# Patient Record
Sex: FEMALE | Race: OTHER | HISPANIC OR LATINO | Employment: FULL TIME | URBAN - NONMETROPOLITAN AREA
[De-identification: names, ages, dates, MRNs, and addresses within clinical notes are randomized per-mention and may not be internally consistent; named-entity substitution may affect disease eponyms.]

---

## 2023-09-26 ENCOUNTER — OFFICE VISIT (OUTPATIENT)
Dept: URGENT CARE | Facility: CLINIC | Age: 38
End: 2023-09-26
Payer: COMMERCIAL

## 2023-09-26 VITALS
OXYGEN SATURATION: 99 % | DIASTOLIC BLOOD PRESSURE: 60 MMHG | TEMPERATURE: 98 F | SYSTOLIC BLOOD PRESSURE: 110 MMHG | HEART RATE: 88 BPM | RESPIRATION RATE: 19 BRPM

## 2023-09-26 DIAGNOSIS — U07.1 COVID-19: ICD-10-CM

## 2023-09-26 DIAGNOSIS — R05.1 ACUTE COUGH: Primary | ICD-10-CM

## 2023-09-26 LAB
SARS-COV-2 AG UPPER RESP QL IA: POSITIVE
VALID CONTROL: ABNORMAL

## 2023-09-26 PROCEDURE — 87811 SARS-COV-2 COVID19 W/OPTIC: CPT

## 2023-09-26 PROCEDURE — 99203 OFFICE O/P NEW LOW 30 MIN: CPT

## 2023-09-26 RX ORDER — BENZONATATE 200 MG/1
200 CAPSULE ORAL 3 TIMES DAILY PRN
Qty: 20 CAPSULE | Refills: 0 | Status: SHIPPED | OUTPATIENT
Start: 2023-09-26

## 2023-09-26 RX ORDER — FLUTICASONE PROPIONATE 50 MCG
2 SPRAY, SUSPENSION (ML) NASAL DAILY
Qty: 11.1 ML | Refills: 0 | Status: SHIPPED | OUTPATIENT
Start: 2023-09-26

## 2023-09-26 NOTE — PROGRESS NOTES
[facility]        NAME: Shira Leung is a 41 y.o. female  : 1985    MRN: [id number]  DATE: 2023  TIME: 10:45 AM    Assessment and Plan   Acute cough [R05.1]  1. Acute cough  Poct Covid 19 Rapid Antigen Test    benzonatate (TESSALON) 200 MG capsule      2. COVID-19  fluticasone (FLONASE) 50 mcg/act nasal spray        Rapid COVID test was positive. Given advice for at home remedies as well as return to work note. Vital signs within normal limits. Advised follow-up family doctor if no improvement. Advised to go to the emergency room if symptoms worsen. Patient Instructions     Tylenol/ibuprofen for pain or fever. Make sure to stay well-hydrated and rest.  Water, Gatorade, Pedialyte. For sore throat-May try warm tea with honey, teaspoon of honey, throat lozenges, warm salt gargles. Continue with Mucinex. Multivitamin, zinc, vitamin D3 2000 IU daily, vitamin C 1000 mg twice daily. You tested positive for COVID today-CDC guidelines recommends 5 days of isolation followed by 5 days of masking. Your isolation would end  on . Recommended to wear a mask for 5 days following this date. If no improvement in symptoms-follow-up with your family doctor. Go to the ER if symptoms worsen. Follow up with PCP in 3-5 days. Proceed to  ER if symptoms worsen. Chief Complaint     Chief Complaint   Patient presents with   • Earache   • Sore Throat   • Cough   • Headache   • Fatigue   • sinus congestion     Started 1 day ago  OTC mucinex   Request note for work         History of Present Illness       29-year-old female presents to the clinic with 1 day of earache, sore throat, cough, headache, fatigue, sinus congestion. PT denies sick contacts. Tried over-the-counter Mucinex with minimal relief. Taking Tylenol/ibuprofen as needed. Has been drinking plenty of fluids.   Denies any fever, chest pain, shortness of breath, abdominal pain, nausea, vomiting, diarrhea. Review of Systems   Review of Systems   Constitutional: Positive for fatigue. Negative for chills and fever. HENT: Positive for congestion, postnasal drip, rhinorrhea and sore throat. Negative for ear discharge and ear pain. Eyes: Negative. Respiratory: Positive for cough. Negative for shortness of breath and wheezing. Cardiovascular: Negative. Gastrointestinal: Negative. Genitourinary: Negative. Musculoskeletal: Positive for arthralgias and myalgias. Skin: Negative. Neurological: Negative. Psychiatric/Behavioral: Negative. Current Medications       Current Outpatient Medications:   •  benzonatate (TESSALON) 200 MG capsule, Take 1 capsule (200 mg total) by mouth 3 (three) times a day as needed for cough, Disp: 20 capsule, Rfl: 0  •  fluticasone (FLONASE) 50 mcg/act nasal spray, 2 sprays into each nostril daily, Disp: 11.1 mL, Rfl: 0    Current Allergies     Allergies as of 09/26/2023   • (No Known Allergies)            The following portions of the patient's history were reviewed and updated as appropriate: allergies, current medications, past family history, past medical history, past social history, past surgical history and problem list.     History reviewed. No pertinent past medical history. Past Surgical History:   Procedure Laterality Date   • NOSE SURGERY         No family history on file. Medications have been verified. Objective   /60   Pulse 88   Temp 98 °F (36.7 °C)   Resp 19   SpO2 99%        Physical Exam     Physical Exam  Constitutional:       General: She is not in acute distress. Appearance: Normal appearance. She is not ill-appearing. HENT:      Head: Normocephalic and atraumatic. Right Ear: Tympanic membrane, ear canal and external ear normal.      Left Ear: Tympanic membrane, ear canal and external ear normal.      Nose: Congestion and rhinorrhea present.       Mouth/Throat:      Mouth: Mucous membranes are moist.      Pharynx: Oropharynx is clear. Posterior oropharyngeal erythema (mild) present. No oropharyngeal exudate. Eyes:      Extraocular Movements: Extraocular movements intact. Conjunctiva/sclera: Conjunctivae normal.      Pupils: Pupils are equal, round, and reactive to light. Cardiovascular:      Rate and Rhythm: Normal rate and regular rhythm. Pulses: Normal pulses. Heart sounds: Normal heart sounds. Pulmonary:      Effort: Pulmonary effort is normal. No respiratory distress. Breath sounds: Normal breath sounds. No stridor. No wheezing, rhonchi or rales. Chest:      Chest wall: No tenderness. Musculoskeletal:         General: Normal range of motion. Cervical back: Normal range of motion and neck supple. Lymphadenopathy:      Cervical: No cervical adenopathy. Skin:     General: Skin is warm and dry. Capillary Refill: Capillary refill takes less than 2 seconds. Findings: No rash. Neurological:      General: No focal deficit present. Mental Status: She is alert and oriented to person, place, and time. Mental status is at baseline.    Psychiatric:         Mood and Affect: Mood normal.         Behavior: Behavior normal.

## 2023-09-26 NOTE — LETTER
September 26, 2023     Patient: Armen Bol[address]   YOB: 1985   Date of Visit: 9/26/2023       To Whom it May Concern: Armen Bolivar [address] was seen in my clinic on 9/26/2023. She tested positive for COVID today on 9/26. Per CDC recommendations, 5 days of isolation would end on Sunday, October 1. She is able to return to work on this date or the following day on October 2. Recommended to wear a mask for an additional 5 days. If you have any questions or concerns, please don't hesitate to call.          Sincerely,          Fermín Barksdale PA-C        CC: No Recipients

## 2023-09-26 NOTE — PATIENT INSTRUCTIONS
Tylenol/ibuprofen for pain or fever. Make sure to stay well-hydrated and rest.  Water, Gatorade, Pedialyte. For sore throat-May try warm tea with honey, teaspoon of honey, throat lozenges, warm salt gargles. Continue with Mucinex. Multivitamin, zinc, vitamin D3 2000 IU daily, vitamin C 1000 mg twice daily. You tested positive for COVID today-CDC guidelines recommends 5 days of isolation followed by 5 days of masking. Your isolation would end Sunday on October 1. Recommended to wear a mask for 5 days following this date. If no improvement in symptoms-follow-up with your family doctor. Go to the ER if symptoms worsen. Follow up with PCP in 3-5 days. Proceed to  ER if symptoms worsen. COVID-19 (Enfermedad por coronavirus 2019)   LO QUE NECESITA SABER:   La COVID-19 es la enfermedad causada por el nuevo coronavirus descubierto por primera vez en diciembre de 2019. El virus puede diseminarse de 2 a 3 días antes del inicio de los síntomas. El virus ha [place] en varias formas nuevas (llamadas variantes) desde que se descubrió. Gisel variante se puede diseminar más fácilmente o provocar gisel enfermedad más grave que la original.  INSTRUCCIONES SOBRE EL CONRADO HOSPITALARIA:   Llame al número de emergencias local (911 en los [place]) si:  Usted tiene dificultad para respirar o falta de aliento mientras descansa. Usted siente presión o dolor en el pecho que dura más de 5 minutos. Usted tiene confusión o es difícil despertarlo. Regrese a la nas de emergencias si:  La piel de la marvin, los dedos de las ian o de los pies tiene un aspecto azulado o más oscuro de lo habitual.      Llame a harrison médico si:  Tiene fiebre. Usted tiene preguntas o inquietudes acerca de harrison condición o cuidado. Medicamentos: Es posible que usted necesite alguno de los siguientes:  Los descongestivos ayudan a reducir la congestión nasal y Lele Victor Manuel a respirar más fácilmente.  Si freddie pastillas descongestivas, pueden causarle agitación o problemas para dormir. No use aerosol descongestionante por más de unos cuantos días. Los jarabes para la tos ayudan a reducir la tos. Pregúntele a harrison médico cuál tipo de medicamento para la tos es mejor para usted. Acetaminofén [employer] dolor y baja la fiebre. Está disponible sin receta médica. Pregunte la cantidad y la frecuencia con que debe tomarlos. [address] Alivia las etiquetas de todos los demás medicamentos que esté usando para saber si también contienen acetaminofén, o pregunte a harrison médico o farmacéutico. El acetaminofén puede causar daño en el hígado cuando no se freddie de forma correcta. DIANA enrrique el ibuprofeno, ayudan a disminuir la inflamación, el dolor y la [place]. Bianka medicamento está disponible con o sin gisel receta médica. Los DIANA pueden causar sangrado estomacal o problemas renales en ciertas personas. Si usted freddie un medicamento anticoagulante, siempre pregúntele a harrison médico si los DIANA son seguros para usted. Siempre alivia la etiqueta de bianka medicamento y [address] instrucciones. Los anticoagulantes ayudan a evitar los coágulos sanguíneos. Los coágulos pueden ocasionar derrames cerebrales, ataques al corazón y [place]. Hay muchos tipos de anticoagulantes disponibles. Harrison médico le dará instrucciones específicas según el tipo de anticoagulante que reciba. Julian Clap son pautas generales de seguridad para seguir mientras está tomando un anticoagulante:    Esté atento por si hay sangrado y moretones. Esté atento a cualquier sangrado de las encías o nariz. Esté atento a la aparición de betsy en harrison orina y evacuaciones intestinales. Use gisel toalla suave para harrison piel y un cepillo de dientes de cerdas suaves para cepillarse genie dientes. [place] puede evitar que harrison piel o encías sangren. Si usted se afeita, use gisel rasuradora eléctrica. No practique deportes de contacto. Informe a harrison odontólogo y otros médicos que usted freddie anticoagulantes.  Lleve un brazalete o un collar que indique que usted freddie [employer]. No empiece ni suspenda ningún otro medicamento o suplemento, milana cuando se lo indique harrison médico . Muchos medicamentos y suplementos no se pueden usar en combinación con los anticoagulantes. Mehan el anticoagulante exactamente enrrique se lo ordenó harrison médico. No omita ninguna dosis ni tome menos de lo indicado. Informe a harrison médico inmediatamente si usted olvida ki el anticoagulante o si freddie de más. Mehan genie medicamentos enrrique se le haya indicado. Consulte con harrison médico si usted sarita que harrison medicamento no le está ayudando o si presenta efectos secundarios. Infórmele al médico si usted es alérgico a algún medicamento. Mantenga gisel lista actualizada de los [place], las vitaminas y los productos herbales que freddie. Incluya los siguientes datos de los medicamentos: cantidad, frecuencia y motivo de administración. Traiga con usted la lista o los envases de las píldoras a genie citas de seguimiento. Lleve la lista de los medicamentos con usted en cosmo de gisel emergencia. Lo que necesita saber acerca de las vacunas contra la COVID-19: Los médicos recomiendan la vacunación, incluso si ya tuvo COVID-19. Reciba la vacuna contra la COVID-19 según las indicaciones. Se recomienda la vacunación a todas las personas de 6 meses o [place]. Las vacunas contra la COVID-19 se administran en forma de inyección en 1 a 3 dosis primarias. [place] depende de la selma de la vacuna y de la edad de la persona que la recibe. Se recomienda gisel dosis de refuerzo para [employer] de 5 años o más después de completar la serie primaria. Se recomienda gisel segunda dosis refuerzo para Gustavo Kosta de 48 años o más y para los adolescentes inmunodeprimidos. La segunda dosis de refuerzo también se recomienda para todos los que recibieron la selma de la vacuna de 1 dosis enrrique la primera dosis y un refuerzo.  Harrison médico puede darle Miguel Gallegos refuerzos y ayudarlo a programar todas las dosis necesarias. Siga protegiéndose y protegiendo a los demás. Puede contagiarse incluso después de recibir la vacuna. También puede transmitir el virus a otras personas sin saber que tiene la infección. Después de vacunarse, compruebe las normas de viaje locales, nacionales e internacionales. Es posible que tenga que hacerse la prueba antes de viajar. Algunos países exigen evidencia de que la prueba es negativa antes de viajar. Alvarez vez también sea necesario hacer cuarentena tras harrison regreso. Se pueden administrar medicamentos para evitar gisel infección. Los medicamentos pueden administrarse si tiene un alto riesgo de infección y no puede recibir la vacuna. También pueden administrarse si harrison sistema inmunitario no responde kevin a la vacuna. Cómo se propaga el coronavirus 2019: El contacto personal cercano con gisel persona infectada aumenta el riesgo de infección. [place] significa estar dentro de los 6 pies (2 metros) de distancia de la persona elaine por lo menos 15 minutos en 24 horas. El virus viaja en las gotitas que se axel cuando gisel persona habla, canta, tose o estornuda. Las [employer] pueden flotar en el aire por minutos u horas. La infección se produce cuando respira las gotitas o cuando le tocan los ojos o la [place]. El contacto de persona a persona puede propagar el virus. Por ejemplo, gisel persona con el virus en genie ian puede propagarlo al darle la mano a alguien. El virus puede permanecer en objetos y superficies elaine horas o días. Puede infectarse si toca el objeto o la superficie y luego se toca los ojos o la boca. Modos de reducir el riesgo de contraer COVID-19 elaine gisel época de brochase:  Ronit Bowden en casa si está [place] que puede tener COVID-19. Es importante que se quede en casa si está esperando gisel anjana para gisel prueba o los [place] de [place]. Lávese las ian con frecuencia elaine el día. Utilice agua y [place]. Frótese las ian enjabonadas, [employer] dedos, elaine al menos 20 segundos. Enjuáguese con agua corriente caliente. Séquese las ian con gisel toalla limpia o gisel toalla de papel. Puede usar un desinfectante para ian que contenga alcohol, si no hay agua y jabón disponibles. Enseñe a los niños a lavarse las ian y a usar el desinfectante de [address] Cúbrase al toser y estornudar. Gire la marvin y cúbrase la boca y la Wilene Henok con un pañuelo. Deseche el pañuelo. Use el ángulo del brazo si no tiene un pañuelo disponible. Luego lávese las ian con agua y [place] o use un desinfectante de ian. Enséñeles a los niños a cubrirse al toser o estornudar. Use un tapabocas (máscara) cuando sea necesario. Utilice un tapabocas de melissa con al menos 2 capas. También puede crear capas colocando un tapabocas de melissa sobre gisel máscara no médica desechable. Cúbrase la boca y la Wilene Henok. Intente mantener el espacio entre usted y los demás cuando esté fuera de casa. Evite las multitudes lo más que pueda. Use un tapabocas cuando deba estar cerca de un merry valeriy y no pueda mantener espacio entre usted y los demás. Limpie y desinfecte a menudo los objetos y las superficies de alto contacto. Use toallitas húmedas desinfectantes o coery gisel solución de 4 cucharaditas de lejía en 1 cuarto de galón (4 tazas) de agua. Pregunte sobre otras vacunas que usted puede necesitar. Debe recibir la vacuna contra la influenza (gripe) tan pronto enrrique se lo recomienden cada año, generalmente en septiembre u octubre. Vacúnese contra la neumonía si se recomienda. Harrison médico puede indicarle si también debe recibir [employer], y cuándo aplicárselas. Acuda a la consulta de control con harrison médico según las indicaciones: Anote genie preguntas para que se acuerde de hacerlas elaine genie visitas.   Para más información:  [facility]  [address] [place] [address]  Jatin Patel  Phone: [phone]  Web Address: [email or web address]    © Copyright Thana Givens 2023 Information is for End User's use only and may not be sold, redistributed or otherwise used for commercial purposes. Esta información es sólo para uso en educación. Harrison intención no es darle un consejo médico sobre enfermedades o tratamientos. Colsulte con harrison Karyle Shown farmacéutico antes de seguir cualquier régimen médico para saber si es seguro y efectivo para usted. Tos aguda   LO QUE NECESITA SABER:   Susan Nims tos aguda puede durar hasta 3 semanas. Las causas comunes de gisel tos aguda incluyen un resfriado, alergias o gisel infección pulmonar. INSTRUCCIONES SOBRE EL CONRADO HOSPITALARIA:   Regrese a la nas de emergencias si:  Tiene dificultad para respirar o le falta el aire. Usted tose betsy o nota betsy en harrison mucosidad. Se desmaya, se siente débil o mareado. [place] el pecho cuando respira hondo o tose. Tiene respiración silbante. Comuníquese con harrison médico si:  Tiene fiebre. La tos dura más de 4 semanas. Joselyn síntomas no mejoran con el tratamiento. Usted tiene preguntas o inquietudes acerca de harrison condición o cuidado. Medicamentos:  Los medicamentos para detener la tos, disminuir la inflamación de las vías respiratorias o ayudar a abrirlas. Los medicamentos también pueden despejar las vías respiratorias. Pregunte a harrison médico qué medicamentos de venta batsheva puede ki. Si tiene gisel infección causada por bacterias, es posible que necesite antibióticos. Manuelito joselyn medicamentos enrrique se le haya indicado. Consulte con harrison médico si usted sarita que harrison medicamento no le está ayudando o si presenta efectos secundarios. Infórmele al médico si usted es alérgico a algún medicamento. Mantenga gisel lista actualizada de los [place], las vitaminas y los productos herbales que freddie. Incluya los siguientes datos de los medicamentos: cantidad, frecuencia y motivo de administración.  Traiga con usted la lista o los envases de las píldoras a genie citas de seguimiento. Lleve la lista de los medicamentos con usted en cosmo de gisel emergencia. El manejo de genie síntomas:  No fume y permanezca lejos de otras personas que fuman. La nicotina y otros químicos contenidos en los cigarrillos y puros pueden causar daño pulmonar y empeorar pitts tos. Pida información a pitts médico si usted actualmente fuma y necesita ayuda para dejar de fumar. Los cigarrillos electrónicos o el tabaco sin humo igualmente contienen nicotina. Consulte con pitts médico antes de [employer]. [place] líquidos [employer] se le indique. Los líquidos le ayudarán a aflojar y disolver la mucosidad para que la pueda expectorar. Los líquidos ayudarán a evitar la deshidratación. Los mejores líquidos para ki son el agua, el jugo y el caldo. No tome líquidos que contienen cafeína. La cafeína puede aumentar el riesgo de deshidratación. Pregúntele a pitts médico cuál es la cantidad de líquido que necesita ingerir a diario. Repose según le indicaron. No realice actividades que empeoren la tos, enrrique el ejercicio físico.    Use un humidificador o vaporizador. Use un humidificador de sue frío o un vaporizador para elevar la humedad en pitts casa. [place] podría ayudarle a respirar más fácilmente y al mismo tiempo disminuir la tos. Ingiera de 2 a 5 ml de [employer] al día. La miel puede ayudar a diluir los mocos y [employer] tos. Utilice gotas o pastillas para la tos. Estas pueden ayudar a disminuir la irritación de la garganta y la tos. Acuda a genie consultas de control con pitts médico según le indicaron: Anote genie preguntas para que se acuerde de hacerlas elaine genie visitas. © Copyright Aidan Montoya 2023 Information is for End User's use only and may not be sold, redistributed or otherwise used for commercial purposes. Esta información es sólo para uso en educación. [place] intención no es darle un consejo médico sobre enfermedades o tratamientos.  Colsulte con pitts Melly Juarez farmacéutico antes de seguir cualquier régimen médico para saber si es seguro y efectivo para usted.